# Patient Record
Sex: FEMALE | Race: WHITE | NOT HISPANIC OR LATINO | URBAN - NONMETROPOLITAN AREA
[De-identification: names, ages, dates, MRNs, and addresses within clinical notes are randomized per-mention and may not be internally consistent; named-entity substitution may affect disease eponyms.]

---

## 2018-04-16 PROBLEM — Z96.1: Noted: 2018-04-16

## 2018-04-16 PROBLEM — H18.52: Noted: 2018-04-16

## 2018-04-16 PROBLEM — H35.3131: Noted: 2018-04-16

## 2019-01-26 ENCOUNTER — IMPORTED ENCOUNTER (OUTPATIENT)
Dept: URBAN - NONMETROPOLITAN AREA CLINIC 1 | Facility: CLINIC | Age: 84
End: 2019-01-26

## 2019-01-26 PROCEDURE — 92014 COMPRE OPH EXAM EST PT 1/>: CPT

## 2019-01-26 PROCEDURE — 92134 CPTRZ OPH DX IMG PST SGM RTA: CPT

## 2019-01-26 NOTE — PATIENT DISCUSSION
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 Pt is to contact our office if any changes are noted. -OCT MAC done today and reviewed with pt Stable OU.-RTC 6 mo dilate Consider IVFA s/p PCIOL-Stable PCIOL s/p YAG Caps OU.-Monitor. FUCHS:- Discussed findings of exam in detail with the patient. - discussed the risk of corneal edema with vision loss and the importance of monitoring this chronic disease.- warned of worsening of disease with cataract surgery higher risk of persistant corneal edema after routine cataract surgery discussed and the need for subsequent DSEK or PK discussed.; Dr's Notes: Mr Mery Grayson passed away July 2014.10/16/17 OCT Mac

## 2020-01-14 ENCOUNTER — IMPORTED ENCOUNTER (OUTPATIENT)
Dept: URBAN - NONMETROPOLITAN AREA CLINIC 1 | Facility: CLINIC | Age: 85
End: 2020-01-14

## 2020-01-14 PROCEDURE — 92014 COMPRE OPH EXAM EST PT 1/>: CPT

## 2020-01-14 NOTE — PATIENT DISCUSSION
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 Pt is to contact our office if any changes are noted. s/p PCIOL-Stable PCIOL s/p YAG Caps OU.-Monitor. FUCHS:- Discussed findings of exam in detail with the patient. - discussed the risk of corneal edema with vision loss and the importance of monitoring this chronic disease.- warned of worsening of disease with cataract surgery higher risk of persistant corneal edema after routine cataract surgery discussed and the need for subsequent DSEK or PK discussed.; 's Notes: Mr Junior Ballesteros passed away July 2014.10/16/17 OCT Mac

## 2022-04-09 ASSESSMENT — VISUAL ACUITY
OD_SC: 20/40+1
OS_SC: 20/60-1
OD_SC: 20/40
OS_SC: 20/60

## 2022-04-09 ASSESSMENT — TONOMETRY
OD_IOP_MMHG: 17
OS_IOP_MMHG: 18
OS_IOP_MMHG: 17
OD_IOP_MMHG: 18